# Patient Record
Sex: FEMALE | Race: BLACK OR AFRICAN AMERICAN | URBAN - METROPOLITAN AREA
[De-identification: names, ages, dates, MRNs, and addresses within clinical notes are randomized per-mention and may not be internally consistent; named-entity substitution may affect disease eponyms.]

---

## 2022-11-14 ENCOUNTER — OFFICE VISIT (OUTPATIENT)
Dept: PRIMARY CARE CLINIC | Age: 52
End: 2022-11-14

## 2022-11-14 VITALS — SYSTOLIC BLOOD PRESSURE: 105 MMHG | TEMPERATURE: 97 F | HEART RATE: 65 BPM | DIASTOLIC BLOOD PRESSURE: 62 MMHG

## 2022-11-14 DIAGNOSIS — R05.9 COUGH, UNSPECIFIED TYPE: ICD-10-CM

## 2022-11-14 DIAGNOSIS — K59.04 CHRONIC IDIOPATHIC CONSTIPATION: ICD-10-CM

## 2022-11-14 DIAGNOSIS — E55.9 VITAMIN D DEFICIENCY: ICD-10-CM

## 2022-11-14 DIAGNOSIS — K30 INDIGESTION: ICD-10-CM

## 2022-11-14 DIAGNOSIS — I10 PRIMARY HYPERTENSION: ICD-10-CM

## 2022-11-14 DIAGNOSIS — R47.01 EXPRESSIVE APHASIA: ICD-10-CM

## 2022-11-14 DIAGNOSIS — D50.9 IRON DEFICIENCY ANEMIA, UNSPECIFIED IRON DEFICIENCY ANEMIA TYPE: ICD-10-CM

## 2022-11-14 DIAGNOSIS — R41.89 COGNITIVE IMPAIRMENT: ICD-10-CM

## 2022-11-14 DIAGNOSIS — G89.29 OTHER CHRONIC PAIN: ICD-10-CM

## 2022-11-14 DIAGNOSIS — F51.01 PRIMARY INSOMNIA: ICD-10-CM

## 2022-11-14 DIAGNOSIS — R56.9 SEIZURE (HCC): ICD-10-CM

## 2022-11-14 DIAGNOSIS — Z11.59 ENCOUNTER FOR HEPATITIS C SCREENING TEST FOR LOW RISK PATIENT: ICD-10-CM

## 2022-11-14 DIAGNOSIS — Z00.00 ANNUAL PHYSICAL EXAM: ICD-10-CM

## 2022-11-14 DIAGNOSIS — Z11.4 ENCOUNTER FOR SCREENING FOR HUMAN IMMUNODEFICIENCY VIRUS (HIV): ICD-10-CM

## 2022-11-14 DIAGNOSIS — I67.1 BRAIN ANEURYSM: Primary | ICD-10-CM

## 2022-11-14 DIAGNOSIS — E87.6 POTASSIUM DEFICIENCY: ICD-10-CM

## 2022-11-14 DIAGNOSIS — E66.09 OBESITY DUE TO EXCESS CALORIES WITH SERIOUS COMORBIDITY, UNSPECIFIED CLASSIFICATION: ICD-10-CM

## 2022-11-14 PROCEDURE — 3074F SYST BP LT 130 MM HG: CPT | Performed by: STUDENT IN AN ORGANIZED HEALTH CARE EDUCATION/TRAINING PROGRAM

## 2022-11-14 PROCEDURE — 99205 OFFICE O/P NEW HI 60 MIN: CPT | Performed by: STUDENT IN AN ORGANIZED HEALTH CARE EDUCATION/TRAINING PROGRAM

## 2022-11-14 PROCEDURE — 3078F DIAST BP <80 MM HG: CPT | Performed by: STUDENT IN AN ORGANIZED HEALTH CARE EDUCATION/TRAINING PROGRAM

## 2022-11-14 RX ORDER — DIPHENHYDRAMINE HCL 25 MG
25 CAPSULE ORAL EVERY 6 HOURS PRN
Qty: 30 CAPSULE | Refills: 5 | Status: SHIPPED | OUTPATIENT
Start: 2022-11-14

## 2022-11-14 RX ORDER — AMOXICILLIN 250 MG
1 CAPSULE ORAL DAILY
Qty: 30 TABLET | Refills: 5 | Status: SHIPPED | OUTPATIENT
Start: 2022-11-14

## 2022-11-14 RX ORDER — BISACODYL 10 MG
10 SUPPOSITORY, RECTAL RECTAL DAILY
Qty: 30 SUPPOSITORY | Refills: 0 | Status: SHIPPED | OUTPATIENT
Start: 2022-11-14 | End: 2022-12-14

## 2022-11-14 RX ORDER — ACETAMINOPHEN 325 MG/1
650 TABLET ORAL EVERY 6 HOURS PRN
Qty: 120 TABLET | Refills: 3 | Status: SHIPPED | OUTPATIENT
Start: 2022-11-14

## 2022-11-14 RX ORDER — CARVEDILOL 12.5 MG/1
12.5 TABLET ORAL 2 TIMES DAILY
Qty: 60 TABLET | Refills: 5 | Status: SHIPPED | OUTPATIENT
Start: 2022-11-14

## 2022-11-14 RX ORDER — LEVETIRACETAM 1000 MG/1
1000 TABLET ORAL 2 TIMES DAILY
Qty: 60 TABLET | Refills: 5 | Status: SHIPPED | OUTPATIENT
Start: 2022-11-14

## 2022-11-14 RX ORDER — LANOLIN ALCOHOL/MO/W.PET/CERES
325 CREAM (GRAM) TOPICAL EVERY OTHER DAY
Qty: 30 TABLET | Refills: 5 | Status: SHIPPED | OUTPATIENT
Start: 2022-11-14

## 2022-11-14 RX ORDER — ATORVASTATIN CALCIUM 40 MG/1
40 TABLET, FILM COATED ORAL DAILY
Qty: 30 TABLET | Refills: 5 | Status: SHIPPED | OUTPATIENT
Start: 2022-11-14

## 2022-11-14 RX ORDER — LANOLIN ALCOHOL/MO/W.PET/CERES
6 CREAM (GRAM) TOPICAL NIGHTLY PRN
Qty: 60 TABLET | Refills: 5 | Status: SHIPPED | OUTPATIENT
Start: 2022-11-14

## 2022-11-14 RX ORDER — GUAIFENESIN/DEXTROMETHORPHAN 100-10MG/5
5 SYRUP ORAL 3 TIMES DAILY PRN
Qty: 120 ML | Refills: 5 | Status: SHIPPED | OUTPATIENT
Start: 2022-11-14 | End: 2022-11-24

## 2022-11-14 RX ORDER — DICLOFENAC SODIUM 75 MG/1
75 TABLET, DELAYED RELEASE ORAL 2 TIMES DAILY
Qty: 60 TABLET | Refills: 5 | Status: SHIPPED | OUTPATIENT
Start: 2022-11-14

## 2022-11-14 RX ORDER — ERGOCALCIFEROL 1.25 MG/1
50000 CAPSULE ORAL WEEKLY
Qty: 4 CAPSULE | Refills: 5 | Status: SHIPPED | OUTPATIENT
Start: 2022-11-14

## 2022-11-14 RX ORDER — AMLODIPINE BESYLATE 10 MG/1
10 TABLET ORAL DAILY
Qty: 30 TABLET | Refills: 5 | Status: SHIPPED | OUTPATIENT
Start: 2022-11-14

## 2022-11-14 RX ORDER — POTASSIUM CHLORIDE 1500 MG/1
20 TABLET, FILM COATED, EXTENDED RELEASE ORAL
Qty: 30 TABLET | Refills: 5 | Status: SHIPPED | OUTPATIENT
Start: 2022-11-14

## 2022-11-14 SDOH — ECONOMIC STABILITY: FOOD INSECURITY: WITHIN THE PAST 12 MONTHS, YOU WORRIED THAT YOUR FOOD WOULD RUN OUT BEFORE YOU GOT MONEY TO BUY MORE.: NEVER TRUE

## 2022-11-14 SDOH — ECONOMIC STABILITY: FOOD INSECURITY: WITHIN THE PAST 12 MONTHS, THE FOOD YOU BOUGHT JUST DIDN'T LAST AND YOU DIDN'T HAVE MONEY TO GET MORE.: NEVER TRUE

## 2022-11-14 ASSESSMENT — PATIENT HEALTH QUESTIONNAIRE - PHQ9
SUM OF ALL RESPONSES TO PHQ QUESTIONS 1-9: 0
SUM OF ALL RESPONSES TO PHQ QUESTIONS 1-9: 0
SUM OF ALL RESPONSES TO PHQ9 QUESTIONS 1 & 2: 0
SUM OF ALL RESPONSES TO PHQ QUESTIONS 1-9: 0
SUM OF ALL RESPONSES TO PHQ QUESTIONS 1-9: 0
1. LITTLE INTEREST OR PLEASURE IN DOING THINGS: 0
2. FEELING DOWN, DEPRESSED OR HOPELESS: 0

## 2022-11-14 ASSESSMENT — SOCIAL DETERMINANTS OF HEALTH (SDOH): HOW HARD IS IT FOR YOU TO PAY FOR THE VERY BASICS LIKE FOOD, HOUSING, MEDICAL CARE, AND HEATING?: NOT HARD AT ALL

## 2022-11-14 NOTE — PATIENT INSTRUCTIONS
CORRECT NAME IN CHART AT     [facility] - Marisol Velasquez MD   [place] [id number] [facility] [address] , [address]   Ph: [phone]    - Request a neuropsych evaluation     Get labs done today on the first floor on your way out

## 2022-11-14 NOTE — PROGRESS NOTES
[facility]  2022    Marcellus Barber (:  1970) is a 46 y.o. female, here for evaluation of the following medical concerns:    Chief Complaint   Patient presents with    Establish Care        ASSESSMENT/ PLAN  1. Brain aneurysm  This is a new problem, occurred in 2022 with significant neurodeficits requiring 24-hour care at this point. Patient's name needs to be corrected in the chart so that I can review the EMR from [facility] and . Referral placed to follow-up with neurosurgery, recommend formal neuropsych evaluation, medications refilled. - RYELE - Dimas Soto MD, Neurosurgery (Vascular), [facility]  - apixaban (ELIQUIS) 5 MG TABS tablet; Take 1 tablet by mouth 2 times daily  Dispense: 60 tablet; Refill: 5  - atorvastatin (LIPITOR) 40 MG tablet; Take 1 tablet by mouth daily  Dispense: 30 tablet; Refill: 5    2. Primary hypertension  Controlled, medications refilled  - Comprehensive Metabolic Panel; Future  - Lipid Panel; Future  - amLODIPine (NORVASC) 10 MG tablet; Take 1 tablet by mouth daily  Dispense: 30 tablet; Refill: 5  - carvedilol (COREG) 12.5 MG tablet; Take 1 tablet by mouth 2 times daily  Dispense: 60 tablet; Refill: 5    3. Primary insomnia  Controlled, medications refilled  - melatonin (RA MELATONIN) 3 MG TABS tablet; Take 2 tablets by mouth nightly as needed (insomnia)  Dispense: 60 tablet; Refill: 5    4. Iron deficiency anemia, unspecified iron deficiency anemia type  Reportedly well controlled, medications refilled  - CBC with Auto Differential; Future  - Iron and TIBC; Future  - ferrous sulfate (FE TABS 325) 325 (65 Fe) MG EC tablet; Take 1 tablet by mouth every other day  Dispense: 30 tablet; Refill: 5    5. Obesity due to excess calories with serious comorbidity, unspecified classification    6.  Chronic idiopathic constipation  Controlled, medications refilled  - bisacodyl (DULCOLAX) 10 MG suppository; Place 1 suppository rectally daily  Dispense: 30 suppository; Refill: 0  - senna-docusate (SENNA PLUS) 8.6-50 MG per tablet; Take 1 tablet by mouth daily  Dispense: 30 tablet; Refill: 5    7. Annual physical exam  Check screening labs  - Comprehensive Metabolic Panel; Future  - Hemoglobin A1C; Future  - Lipid Panel; Future    8. Encounter for hepatitis C screening test for low risk patient  - Hepatitis C Antibody; Future    9. Encounter for screening for human immunodeficiency virus (HIV)  - HIV Screen; Future    10. Seizure ([address]  Controlled, medications refilled, check Keppra level  - Levetiracetam Level; Future  - levETIRAcetam (KEPPRA) 1000 MG tablet; Take 1 tablet by mouth 2 times daily  Dispense: 60 tablet; Refill: 5    11. Cognitive impairment  12. Expressive aphasia  Secondary to ruptured aneurysm, continue to monitor. Needs formal neuropsych evaluation. 13. Indigestion  Controlled, medications refilled  - magnesium hydroxide (MILK OF MAGNESIA) 400 MG/5ML suspension; Take 30 mLs by mouth daily as needed for Constipation  Dispense: 118 mL; Refill: 5  - bismuth subsalicylate (PEPTO BISMOL) 262 MG/15ML suspension; Take 15 mLs by mouth every 6 hours as needed for Indigestion  Dispense: 118 mL; Refill: 5    14. Other chronic pain  Controlled, medications refilled  - acetaminophen (AMINOFEN) 325 MG tablet; Take 2 tablets by mouth every 6 hours as needed for Pain  Dispense: 120 tablet; Refill: 3  - diclofenac (VOLTAREN) 75 MG EC tablet; Take 1 tablet by mouth 2 times daily  Dispense: 60 tablet; Refill: 5    15. Cough, unspecified type  Controlled, medications refilled  - diphenhydrAMINE (BENADRYL ALLERGY) 25 MG capsule; Take 1 capsule by mouth every 6 hours as needed for Itching  Dispense: 30 capsule; Refill: 5  - guaiFENesin-dextromethorphan (ROBITUSSIN DM) 100-10 MG/5ML syrup; Take 5 mLs by mouth 3 times daily as needed for Cough  Dispense: 120 mL; Refill: 5    16.  Potassium deficiency  Reportedly well controlled, recheck lab and medications refilled  - potassium chloride (KLOR-CON M) 20 MEQ TBCR extended release tablet; Take 1 tablet by mouth daily (with breakfast)  Dispense: 30 tablet; Refill: 5    17. Vitamin D deficiency  - vitamin D (ERGOCALCIFEROL) 1.25 MG (40838 UT) CAPS capsule; Take 1 capsule by mouth once a week  Dispense: 4 capsule; Refill: 5     Return in about 6 months (around 5/14/2023) for HTN, aneurysm, cognitive impairment . 62 minutes spent on chart review, care coordination and patient counseling regarding disease state, lifestyle modifications and/or health maintenance screening. HPI  Patient presents today to establish care, nursing home discharge for complex care related to brain aneurysm. She is reliant on 24-hour care, living with her daughter who presents with her today. She is on a stretcher, and was transported by the fire department. Of note, her name is wrong in the chart -she received her care through [facility] under the accurate name of Mary Alcocer. History is provided by the daughter. No EMR records available for review today. Patient was living independently prior to brain aneurysm rupture in July 2022. She was admitted to [facility] from July to August of 2022, then transferred [facility]/nursing home until last week (early November). She is now living at home with her daughter, and receives home PT/OT/speech/skilled nursing/home health aide. Daughter is requesting that all of her medications be refilled today, and has a list which was provided by the nursing home. Prior to aneurysm, patient had normal cognition and was working as a home health aide. Previous medical history includes hypertension, obesity per daughter's report. Her aneurysm rupture was repaired by Dr. Jemima Norman, [facility]. Her recovery was complicated by new onset of seizures, which have been controlled by Keppra.   Daughter is scheduling a follow-up appointment with him today, and also requesting a neuropsych eval to gain power of  status. Daughter reports that the patient is alert and oriented to self, date of birth but is frequently unaware of time, location. Patient is frequently agitated, and daughter states that her level of functioning is \"similar to my 1year-old child. \"  Daughter also states that the patient has significant word finding difficulty. ROS  Review of Systems   Unable to perform ROS: Mental status change     HISTORIES  No current outpatient medications on file prior to visit. No current facility-administered medications on file prior to visit. Past Medical History:   Diagnosis Date    Brain aneurysm      Patient Active Problem List   Diagnosis    Expressive aphasia    Cognitive impairment    Seizure [address] [address]    Chronic idiopathic constipation    Obesity due to excess calories with serious comorbidity    Iron deficiency anemia    Primary insomnia    Primary hypertension    Brain aneurysm    Indigestion    Other chronic pain    Cough    Potassium deficiency    Vitamin D deficiency       PE  Vitals:    11/14/22 0927   BP: 105/62   Pulse: 65   Temp: 97 °F (36.1 °C)   TempSrc: Temporal     There is no height or weight on file to calculate BMI. Physical Exam  Vitals reviewed. Constitutional:       General: She is not in acute distress. Appearance: She is obese. Comments: Laying in stretcher, minimally cooperative with neuro exam although no obvious deficits noted. Alert and oriented to self and date of birth. Somnolent. HENT:      Head: Normocephalic and atraumatic. Eyes:      Extraocular Movements: Extraocular movements intact. Pupils: Pupils are equal, round, and reactive to light. Cardiovascular:      Rate and Rhythm: Normal rate and regular rhythm. Pulses: Normal pulses. Heart sounds: Normal heart sounds. No murmur heard. No gallop. Pulmonary:      Effort: Pulmonary effort is normal.      Breath sounds: Normal breath sounds.  No wheezing or rales. Abdominal:      General: Abdomen is flat. Palpations: Abdomen is soft. Musculoskeletal:      Right lower leg: No edema. Left lower leg: No edema. Skin:     General: Skin is warm and dry. Neurological:      Mental Status: She is alert. Soham Phillip DO    This dictation was generated by voice recognition computer software. Although all attempts are made to edit the dictation for accuracy, there may be errors in the transcription that are not intended.

## 2022-12-14 PROBLEM — R05.9 COUGH: Status: RESOLVED | Noted: 2022-11-14 | Resolved: 2022-12-14

## 2023-02-08 ENCOUNTER — TELEPHONE (OUTPATIENT)
Dept: PRIMARY CARE CLINIC | Age: 53
End: 2023-02-08

## 2023-02-08 NOTE — TELEPHONE ENCOUNTER
Good Afternoon,     My name is Dina, I am the Nurse here at [facility].      So we can keep giving our best we just wanted to remind you to make sure to schedule your Colonoscopy this year and if you need help let me know.  I can refer you or if you have the Cologuard kit, if you could follow the instructions and send back.     Your mammogram is also due this year unless you have had it done elsewhere, please let us know.  That will help us in continuing with your best care.       Have A Great New Year!        Dina Matos LPN  [facility]  [phone] # 1

## 2023-04-16 DIAGNOSIS — I10 PRIMARY HYPERTENSION: ICD-10-CM

## 2023-04-17 RX ORDER — AMLODIPINE BESYLATE 10 MG/1
10 TABLET ORAL DAILY
Qty: 30 TABLET | Refills: 5 | Status: SHIPPED | OUTPATIENT
Start: 2023-04-17

## 2023-04-17 NOTE — TELEPHONE ENCOUNTER
Medication:   Requested Prescriptions     Pending Prescriptions Disp Refills    amLODIPine (NORVASC) 10 MG tablet [Pharmacy Med Name: AMLODIPINE BESYLATE 10MG TABLETS] 30 tablet 5     Sig: TAKE 1 TABLET BY MOUTH DAILY        Last Filled:  11/14/2022     Patient Phone Number: [phone] (home)     Last appt: 11/14/2022   Next appt: Visit date not found        Return in about 6 months (around 5/14/2023) for HTN, aneurysm, cognitive impairment

## 2023-07-21 DIAGNOSIS — G89.29 OTHER CHRONIC PAIN: ICD-10-CM

## 2023-07-21 RX ORDER — DICLOFENAC SODIUM 75 MG/1
TABLET, DELAYED RELEASE ORAL
Qty: 60 TABLET | Refills: 5 | OUTPATIENT
Start: 2023-07-21

## 2023-07-21 NOTE — TELEPHONE ENCOUNTER
Medication:   Requested Prescriptions     Pending Prescriptions Disp Refills    diclofenac (VOLTAREN) 75 MG EC tablet [Pharmacy Med Name: DICLOFENAC SODIUM 75MG DR TABLETS] 60 tablet 5     Sig: TAKE 1 TABLET BY MOUTH TWICE DAILY        Last Filled:  11/14/22    Patient Phone Number: [phone] (home)     Last appt: 11/14/2022   Next appt: Visit date not found  Return in about 6 months (around 5/14/2023) for HTN, aneurysm, cognitive impairment   Last OARRS: No flowsheet data found.

## 2023-09-27 ENCOUNTER — TELEPHONE (OUTPATIENT)
Dept: PRIMARY CARE CLINIC | Age: 53
End: 2023-09-27

## 2023-09-27 NOTE — TELEPHONE ENCOUNTER
lmom 05/26/23, 08/23/23, 09/27/23 mammo due, Patient hasn not been seen since 11/14/22, nor has followed up with Dr. Fox Garcia